# Patient Record
Sex: MALE | Race: ASIAN | Employment: FULL TIME | URBAN - METROPOLITAN AREA
[De-identification: names, ages, dates, MRNs, and addresses within clinical notes are randomized per-mention and may not be internally consistent; named-entity substitution may affect disease eponyms.]

---

## 2017-02-09 ENCOUNTER — CARE COORDINATION (OUTPATIENT)
Dept: CASE MANAGEMENT | Facility: CLINIC | Age: 32
End: 2017-02-09

## 2017-02-14 ENCOUNTER — CARE COORDINATION (OUTPATIENT)
Dept: CASE MANAGEMENT | Facility: CLINIC | Age: 32
End: 2017-02-14

## 2017-03-16 ENCOUNTER — CARE COORDINATION (OUTPATIENT)
Dept: CASE MANAGEMENT | Facility: CLINIC | Age: 32
End: 2017-03-16

## 2017-04-06 ENCOUNTER — CARE COORDINATION (OUTPATIENT)
Dept: CASE MANAGEMENT | Facility: CLINIC | Age: 32
End: 2017-04-06

## 2020-09-09 ENCOUNTER — APPOINTMENT (OUTPATIENT)
Dept: URBAN - METROPOLITAN AREA CLINIC 256 | Age: 35
Setting detail: DERMATOLOGY
End: 2020-09-10

## 2020-09-09 VITALS — WEIGHT: 177 LBS | RESPIRATION RATE: 14 BRPM | HEIGHT: 67 IN

## 2020-09-09 DIAGNOSIS — L82.1 OTHER SEBORRHEIC KERATOSIS: ICD-10-CM

## 2020-09-09 DIAGNOSIS — L21.8 OTHER SEBORRHEIC DERMATITIS: ICD-10-CM

## 2020-09-09 DIAGNOSIS — L85.3 XEROSIS CUTIS: ICD-10-CM

## 2020-09-09 PROCEDURE — OTHER ADDITIONAL NOTES: OTHER

## 2020-09-09 PROCEDURE — 99202 OFFICE O/P NEW SF 15 MIN: CPT

## 2020-09-09 PROCEDURE — OTHER COUNSELING: OTHER

## 2020-09-09 ASSESSMENT — LOCATION DETAILED DESCRIPTION DERM
LOCATION DETAILED: RIGHT BUTTOCK
LOCATION DETAILED: LEFT BUTTOCK
LOCATION DETAILED: POSTERIOR MID-PARIETAL SCALP
LOCATION DETAILED: RIGHT DISTAL RADIAL DORSAL FOREARM

## 2020-09-09 ASSESSMENT — LOCATION SIMPLE DESCRIPTION DERM
LOCATION SIMPLE: POSTERIOR SCALP
LOCATION SIMPLE: RIGHT BUTTOCK
LOCATION SIMPLE: LEFT BUTTOCK
LOCATION SIMPLE: RIGHT FOREARM

## 2020-09-09 ASSESSMENT — LOCATION ZONE DERM
LOCATION ZONE: TRUNK
LOCATION ZONE: ARM
LOCATION ZONE: SCALP

## 2020-09-09 NOTE — HPI: RASH
What Type Of Note Output Would You Prefer (Optional)?: Bullet Format
How Severe Is Your Rash?: moderate
Is This A New Presentation, Or A Follow-Up?: Rash
Additional History: In hot weather his scalp gets itchy. Only happens once in awhile\\nAlso has a spot on his arm. Asymptomatic

## 2020-09-09 NOTE — PROCEDURE: COUNSELING
Patient Specific Counseling (Will Not Stick From Patient To Patient): Recommended OTC dandruff shampoos
Detail Level: Zone
Patient Specific Counseling (Will Not Stick From Patient To Patient): Recommended moisturizing daily
Detail Level: Simple
Detail Level: Detailed

## 2021-08-20 ENCOUNTER — HOSPITAL ENCOUNTER (EMERGENCY)
Facility: CLINIC | Age: 36
Discharge: HOME | End: 2021-08-21
Attending: EMERGENCY MEDICINE | Admitting: EMERGENCY MEDICINE
Payer: COMMERCIAL

## 2021-08-20 ENCOUNTER — APPOINTMENT (OUTPATIENT)
Dept: GENERAL RADIOLOGY | Facility: CLINIC | Age: 36
End: 2021-08-20
Attending: EMERGENCY MEDICINE
Payer: COMMERCIAL

## 2021-08-20 DIAGNOSIS — R06.02 SHORTNESS OF BREATH: ICD-10-CM

## 2021-08-20 DIAGNOSIS — R42 LIGHTHEADEDNESS: ICD-10-CM

## 2021-08-20 DIAGNOSIS — R11.2 NAUSEA AND VOMITING, INTRACTABILITY OF VOMITING NOT SPECIFIED, UNSPECIFIED VOMITING TYPE: ICD-10-CM

## 2021-08-20 LAB
ANION GAP SERPL CALCULATED.3IONS-SCNC: 4 MMOL/L (ref 3–14)
BASOPHILS # BLD AUTO: 0 10E3/UL (ref 0–0.2)
BASOPHILS NFR BLD AUTO: 1 %
BUN SERPL-MCNC: 9 MG/DL (ref 7–30)
CALCIUM SERPL-MCNC: 9.2 MG/DL (ref 8.5–10.1)
CHLORIDE BLD-SCNC: 102 MMOL/L (ref 94–109)
CO2 SERPL-SCNC: 28 MMOL/L (ref 20–32)
CREAT SERPL-MCNC: 1.11 MG/DL (ref 0.66–1.25)
D DIMER PPP FEU-MCNC: 0.33 UG/ML FEU (ref 0–0.5)
EOSINOPHIL # BLD AUTO: 0 10E3/UL (ref 0–0.7)
EOSINOPHIL NFR BLD AUTO: 1 %
ERYTHROCYTE [DISTWIDTH] IN BLOOD BY AUTOMATED COUNT: 13.8 % (ref 10–15)
GFR SERPL CREATININE-BSD FRML MDRD: 86 ML/MIN/1.73M2
GLUCOSE BLD-MCNC: 226 MG/DL (ref 70–99)
HCT VFR BLD AUTO: 43.7 % (ref 40–53)
HGB BLD-MCNC: 14.2 G/DL (ref 13.3–17.7)
HOLD SPECIMEN: NORMAL
IMM GRANULOCYTES # BLD: 0 10E3/UL
IMM GRANULOCYTES NFR BLD: 0 %
LYMPHOCYTES # BLD AUTO: 2.9 10E3/UL (ref 0.8–5.3)
LYMPHOCYTES NFR BLD AUTO: 40 %
MCH RBC QN AUTO: 25 PG (ref 26.5–33)
MCHC RBC AUTO-ENTMCNC: 32.5 G/DL (ref 31.5–36.5)
MCV RBC AUTO: 77 FL (ref 78–100)
MONOCYTES # BLD AUTO: 0.5 10E3/UL (ref 0–1.3)
MONOCYTES NFR BLD AUTO: 7 %
NEUTROPHILS # BLD AUTO: 3.7 10E3/UL (ref 1.6–8.3)
NEUTROPHILS NFR BLD AUTO: 51 %
NRBC # BLD AUTO: 0 10E3/UL
NRBC BLD AUTO-RTO: 0 /100
PLATELET # BLD AUTO: 318 10E3/UL (ref 150–450)
POTASSIUM BLD-SCNC: 3.6 MMOL/L (ref 3.4–5.3)
RBC # BLD AUTO: 5.68 10E6/UL (ref 4.4–5.9)
SODIUM SERPL-SCNC: 134 MMOL/L (ref 133–144)
TROPONIN I SERPL-MCNC: <0.015 UG/L (ref 0–0.04)
WBC # BLD AUTO: 7.2 10E3/UL (ref 4–11)

## 2021-08-20 PROCEDURE — 250N000011 HC RX IP 250 OP 636

## 2021-08-20 PROCEDURE — 80048 BASIC METABOLIC PNL TOTAL CA: CPT | Performed by: EMERGENCY MEDICINE

## 2021-08-20 PROCEDURE — 85004 AUTOMATED DIFF WBC COUNT: CPT | Performed by: EMERGENCY MEDICINE

## 2021-08-20 PROCEDURE — 99285 EMERGENCY DEPT VISIT HI MDM: CPT | Mod: 25

## 2021-08-20 PROCEDURE — 93005 ELECTROCARDIOGRAM TRACING: CPT

## 2021-08-20 PROCEDURE — 96360 HYDRATION IV INFUSION INIT: CPT

## 2021-08-20 PROCEDURE — 85379 FIBRIN DEGRADATION QUANT: CPT | Performed by: EMERGENCY MEDICINE

## 2021-08-20 PROCEDURE — 84484 ASSAY OF TROPONIN QUANT: CPT | Performed by: EMERGENCY MEDICINE

## 2021-08-20 PROCEDURE — C9803 HOPD COVID-19 SPEC COLLECT: HCPCS

## 2021-08-20 PROCEDURE — 36415 COLL VENOUS BLD VENIPUNCTURE: CPT | Performed by: EMERGENCY MEDICINE

## 2021-08-20 PROCEDURE — 71046 X-RAY EXAM CHEST 2 VIEWS: CPT

## 2021-08-20 PROCEDURE — 258N000003 HC RX IP 258 OP 636: Performed by: EMERGENCY MEDICINE

## 2021-08-20 PROCEDURE — 85025 COMPLETE CBC W/AUTO DIFF WBC: CPT | Performed by: EMERGENCY MEDICINE

## 2021-08-20 PROCEDURE — 87635 SARS-COV-2 COVID-19 AMP PRB: CPT | Performed by: EMERGENCY MEDICINE

## 2021-08-20 RX ORDER — ONDANSETRON 2 MG/ML
INJECTION INTRAMUSCULAR; INTRAVENOUS
Status: COMPLETED
Start: 2021-08-20 | End: 2021-08-20

## 2021-08-20 RX ORDER — SODIUM CHLORIDE 9 MG/ML
INJECTION, SOLUTION INTRAVENOUS CONTINUOUS
Status: DISCONTINUED | OUTPATIENT
Start: 2021-08-20 | End: 2021-08-21 | Stop reason: HOSPADM

## 2021-08-20 RX ADMIN — SODIUM CHLORIDE 1000 ML: 9 INJECTION, SOLUTION INTRAVENOUS at 23:04

## 2021-08-20 RX ADMIN — ONDANSETRON 4 MG: 2 INJECTION INTRAMUSCULAR; INTRAVENOUS at 20:47

## 2021-08-20 ASSESSMENT — MIFFLIN-ST. JEOR: SCORE: 1733.25

## 2021-08-21 ENCOUNTER — TRANSFERRED RECORDS (OUTPATIENT)
Dept: HEALTH INFORMATION MANAGEMENT | Facility: CLINIC | Age: 36
End: 2021-08-21

## 2021-08-21 VITALS
SYSTOLIC BLOOD PRESSURE: 134 MMHG | BODY MASS INDEX: 28.88 KG/M2 | HEART RATE: 113 BPM | RESPIRATION RATE: 18 BRPM | WEIGHT: 184 LBS | DIASTOLIC BLOOD PRESSURE: 84 MMHG | HEIGHT: 67 IN | TEMPERATURE: 98.1 F | OXYGEN SATURATION: 99 %

## 2021-08-21 LAB
ATRIAL RATE - MUSE: 87 BPM
DIASTOLIC BLOOD PRESSURE - MUSE: NORMAL MMHG
INTERPRETATION ECG - MUSE: NORMAL
P AXIS - MUSE: 53 DEGREES
PR INTERVAL - MUSE: 154 MS
QRS DURATION - MUSE: 94 MS
QT - MUSE: 350 MS
QTC - MUSE: 421 MS
R AXIS - MUSE: 12 DEGREES
SARS-COV-2 RNA RESP QL NAA+PROBE: NEGATIVE
SYSTOLIC BLOOD PRESSURE - MUSE: NORMAL MMHG
T AXIS - MUSE: 23 DEGREES
VENTRICULAR RATE- MUSE: 87 BPM

## 2021-08-21 ASSESSMENT — ENCOUNTER SYMPTOMS
NAUSEA: 1
DIZZINESS: 1
PALPITATIONS: 0
VOMITING: 1
SHORTNESS OF BREATH: 1

## 2021-08-21 NOTE — DISCHARGE INSTRUCTIONS
Discharge Instructions  Dizziness (Lightheaded)  Today you were seen for dizziness.  Dizziness can be caused by many things and it can be very difficult to determine the cause of dizziness.  At this time, your provider has found no signs that your dizziness is due to a serious or life-threatening condition. However, sometimes there is a serious problem that does not show up right away, and it is important for you to follow up with your regular provider as instructed.  Generally, every Emergency Department visit should have a follow-up clinic visit with either a primary or a specialty clinic/provider. Please follow-up as instructed by your emergency provider today.      Return to the Emergency Department if:    You pass out (fainting or falling out), especially during exercise.    You develop chest pain, chest pressure or difficulty breathing.  Your feel an irregular heartbeat.  You have excessive vaginal bleeding, or blood in your stool or vomit (throw up).  You have a high fever.  Your symptoms get worse or more frequent.    If when you begin to feel dizzy or lightheaded, it is important to sit down or lay down immediately to prevent injury from falling.  If you were given a prescription for medicine here today, be sure to read all of the information (including the package insert) that comes with your prescription.  This will include important information about the medicine, its side effects, and any warnings that you need to know about.  The pharmacist who fills the prescription can provide more information and answer questions you may have about the medicine.  If you have questions or concerns that the pharmacist cannot address, please call or return to the Emergency Department.   Remember that you can always come back to the Emergency Department if you are not able to see your regular provider in the amount of time listed above, if you get any new symptoms, or if there is anything that worries you.    Discharge  Instructions  Hypertension - High Blood Pressure    During you visit to the Emergency Department, your blood pressure was higher than the recommended blood pressure.  This may be related to stress, pain, medication or other temporary conditions. In these cases, your blood pressure may return to normal on its own. If you have a history of high blood pressure, you may need to have your provider adjust your medications. Sometimes, your high measurement here may indicate that you have developed high blood pressure that will stay high unless it is treated. As a general rule, high blood pressure causes problems over years rather than days, weeks, or months. So, while it is important to treat blood pressure, it is rarely important to treat blood pressure immediately. Occasionally we will begin a medication in the Emergency Department; more often we will recommend close follow-up for medications with a primary doctor/clinic.    Generally, every Emergency Department visit should have a follow-up clinic visit with either a primary or a specialty clinic/provider. Please follow-up as instructed by your emergency provider today.    Return to the Emergency Department if you start to have:  A severe headache.  Chest pain.  Shortness of breath.  Weakness or numbness that affects one part of the body.  Confusion.  Vision changes.  Significant swelling of legs and/or eyes.  A reaction to any medication started in the Emergency Department.    What can I do to help myself?  Avoid alcohol.  Take any blood pressure medicine that you are prescribed.  Get a good night s sleep.  Lower your salt intake.  Exercise.  Lose weight.  Manage stress.  See your doctor regularly    If blood pressure medication was started in the Emergency Department:  The medicine may not have an immediate effect. The body and brain determine what blood pressure you have. The medicine s job is to retrain the body s  thermostat  to a lower blood pressure.  You will  need to follow up with your provider to see how this medicine is working for you.  If you were given a prescription for medicine here today, be sure to read all of the information (including the package insert) that comes with your prescription.  This will include important information about the medicine, its side effects, and any warnings that you need to know about.  The pharmacist who fills the prescription can provide more information and answer questions you may have about the medicine.  If you have questions or concerns that the pharmacist cannot address, please call or return to the Emergency Department.   Remember that you can always come back to the Emergency Department if you are not able to see your regular provider in the amount of time listed above, if you get any new symptoms, or if there is anything that worries you.

## 2021-08-21 NOTE — ED TRIAGE NOTES
Patient presents with complaints of sudden onset rapid breathing, dizziness, nausea and vomiting that began about an hour ago. Patient is concerned that is has something to do with his heart, he has been monitored with a loop recorder in the past but says that there were no significant findings. He is alert and oriented x 4, respirations are even and unlabored, skin is warm and dry, and he has been dry heaving in triage since arrival. Patient refused antiemetic.

## 2021-10-09 ENCOUNTER — HEALTH MAINTENANCE LETTER (OUTPATIENT)
Age: 36
End: 2021-10-09

## 2022-09-11 ENCOUNTER — HEALTH MAINTENANCE LETTER (OUTPATIENT)
Age: 37
End: 2022-09-11

## 2023-01-23 ENCOUNTER — HEALTH MAINTENANCE LETTER (OUTPATIENT)
Age: 38
End: 2023-01-23
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
## 2024-02-24 ENCOUNTER — HEALTH MAINTENANCE LETTER (OUTPATIENT)
Age: 39
End: 2024-02-24